# Patient Record
Sex: FEMALE | Race: WHITE
[De-identification: names, ages, dates, MRNs, and addresses within clinical notes are randomized per-mention and may not be internally consistent; named-entity substitution may affect disease eponyms.]

---

## 2019-03-07 ENCOUNTER — HOSPITAL ENCOUNTER (OUTPATIENT)
Dept: HOSPITAL 75 - PREOP | Age: 59
Discharge: HOME | End: 2019-03-07
Attending: PODIATRIST
Payer: COMMERCIAL

## 2019-03-07 VITALS — HEIGHT: 69 IN | WEIGHT: 205 LBS | BODY MASS INDEX: 30.36 KG/M2

## 2019-03-07 DIAGNOSIS — Z01.818: Primary | ICD-10-CM

## 2019-03-11 ENCOUNTER — HOSPITAL ENCOUNTER (OUTPATIENT)
Dept: HOSPITAL 75 - SDC | Age: 59
Discharge: HOME | End: 2019-03-11
Attending: PODIATRIST
Payer: COMMERCIAL

## 2019-03-11 VITALS — SYSTOLIC BLOOD PRESSURE: 135 MMHG | DIASTOLIC BLOOD PRESSURE: 90 MMHG

## 2019-03-11 VITALS — DIASTOLIC BLOOD PRESSURE: 94 MMHG | SYSTOLIC BLOOD PRESSURE: 142 MMHG

## 2019-03-11 VITALS — BODY MASS INDEX: 30.36 KG/M2 | WEIGHT: 205 LBS | HEIGHT: 69 IN

## 2019-03-11 VITALS — SYSTOLIC BLOOD PRESSURE: 135 MMHG | DIASTOLIC BLOOD PRESSURE: 96 MMHG

## 2019-03-11 DIAGNOSIS — Z79.899: ICD-10-CM

## 2019-03-11 DIAGNOSIS — F41.9: ICD-10-CM

## 2019-03-11 DIAGNOSIS — S93.144A: ICD-10-CM

## 2019-03-11 DIAGNOSIS — F17.210: ICD-10-CM

## 2019-03-11 DIAGNOSIS — I10: ICD-10-CM

## 2019-03-11 DIAGNOSIS — M89.371: ICD-10-CM

## 2019-03-11 DIAGNOSIS — E66.9: ICD-10-CM

## 2019-03-11 DIAGNOSIS — M20.41: Primary | ICD-10-CM

## 2019-03-11 PROCEDURE — 73620 X-RAY EXAM OF FOOT: CPT

## 2019-03-11 PROCEDURE — 87081 CULTURE SCREEN ONLY: CPT

## 2019-03-11 RX ADMIN — SODIUM CHLORIDE, SODIUM LACTATE, POTASSIUM CHLORIDE, AND CALCIUM CHLORIDE PRN MLS/HR: 600; 310; 30; 20 INJECTION, SOLUTION INTRAVENOUS at 08:31

## 2019-03-11 RX ADMIN — SODIUM CHLORIDE, SODIUM LACTATE, POTASSIUM CHLORIDE, AND CALCIUM CHLORIDE PRN MLS/HR: 600; 310; 30; 20 INJECTION, SOLUTION INTRAVENOUS at 09:17

## 2019-03-11 NOTE — OPERATIVE REPORT
DATE OF SERVICE:  03/11/2019



SURGEON:

Sierra Pichardo DPM.



PREOPERATIVE DIAGNOSES:

1.  Hammer digit syndrome, right second toe.

2.  Hypertrophic second metatarsal head, right.

3.  Ruptured plantar plate, right second metatarsophalangeal joint.



POSTOPERATIVE DIAGNOSES:

1.  Hammer digit syndrome, right second toe.

2.  Hypertrophic second metatarsal head, right.

3.  Ruptured plantar plate, right second metatarsophalangeal joint.



PROCEDURES:

1.  Reduction of hammertoe, right second digit.

2.  Second metatarsal osteotomy.

3.  Repair of plantar plate, right second metatarsophalangeal joint.



WOUND CLASS:

Clean.



ANESTHESIA:

General.



HEMOSTASIS:

Pneumatic thigh tourniquet at 250 mmHg.



INDICATIONS:

This 58-year-old female presents complaining of painful right forefoot. 

Conservative therapy is met with unsatisfactory results and the patient is

agreeable to surgical intervention after risk and complications were discussed

at length.  No guarantees were extended to the patient and she is willing to

proceed.



DESCRIPTION OF PROCEDURE:

The patient was brought back to the operating table, placed in secure supine

position.  General anesthetic was then induced.  A pneumatic thigh tourniquet

was placed on the right lower extremity over several layers of padding. 

Appropriate timeout was performed.  The right foot was then anesthetized

utilizing aseptic technique utilizing 10 mL of 0.5% Marcaine injected in a local

infusion to the second ray of the right foot.  The right foot was then prepped

and draped in normal sterile manner.  The right foot was then elevated and

allowed to exsanguinate after which the tourniquet was inflated to 250 mmHg.



Attention was then directed to the dorsal aspect of the right second ray where a

5 cm longitudinal linear incision was created overlying the surgical neck of the

second metatarsal extending to the distal interphalangeal joint of the right

second toe.  The incision was deepened in the same plane with great care to

identify and retract all vital neurovascular structures.  Only necessary blood

vessels were cauterized as encountered.  The incision was deepened down to the

extensor tendon overlying the proximal phalanx where a Z-slide lengthening was

performed.  The extensor tendon was reflected proximally and distally exposing

the second metatarsophalangeal joint.  A dorsal capsulorrhaphy was released in

the medial collateral ligaments with emphasis to the medial collateral ligament.

 This allowed the proximal phalanx to come back down into a more rectus

alignment.  Next, dorsal and medial and lateral collateral ligaments of the

proximal interphalangeal joint of the right second toe was released exposing the

head of the proximal phalanx, which was then fashioned into a peg utilizing a

power sagittal saw and a power pepe.  A hole was created to the base of the

middle phalanx with a power pepe for a peg-in-hole type arthrodesis.  The wound

was flushed with copious amounts of normal saline.



Attention was then directed to the head of the right second metatarsal where a

Weil type osteotomy was performed.  The capital fragment was translocated

proximally and was held in this position utilizing a K-wire driven from dorsal

to plantar.  This allowed for visualization of the inferior aspect of the right

second metatarsophalangeal joint and for direct visualization of the plantar

plate and capsule.  There is tenosynovitis noted as well as synovitis within the

capsule itself.  There is longitudinal rupture of the plantar plate with exposed

flexor tendon.  Utilizing the [employer] equipment, the second K-wire was driven to

the proximal phalanx and the proximal attachment at the base of the proximal

phalanx was then released sharply.  A primary repair utilizing 3-0 Vicryl of the

plantar plate was performed.  Next, utilizing the [employer] equipment, the

FiberWire was then attached to the most proximal portion of the plantar plate to

the medial and lateral portions of the joint.  Next, two guidewires were driven

from dorsal medial to plantar lateral and from dorsal lateral to plantar medial.

 The FiberWire was then passed through the holes created at the proximal phalanx

and the plantar plate extended distally and secured underneath the proximal

phalanx utilizing the above-mentioned FiberWire.  Prior to the FiberWire being

secured completely; however, the Weil osteotomy was fixated.  The K-wire was

withdrawn from the metatarsal head and the metatarsal head was allowed to recede

proximally and medially from its original anatomical position prior to

procedure.  Two Snap-off screws were applied of 2.0 diameter, 11 and 9 mm in

length.  The 9 mm was proximal and the 11 mm distal to the metatarsal head area.

 The excess metatarsal head to the dorsal aspect of the joint was reduced with a

rongeur followed by a hand rasp.  The wound was flushed with copious amounts of

normal saline.



Next, a 0.062 smooth K-wire was driven down the digit allowing the right second

toe to come into rectus alignment and securing the peg-in-hole arthrodesis in

place.  The excess K-wire out the end of the toe was cut and a protective ball

placed over the end of the wire.  The wound was flushed once again and closure

was then performed in layers.  The FiberWire was secured allowing the proximal

phalanx to get into appropriate anatomical position with the plantar plate now

extended below the base of the proximal phalanx.  The extensor tendon was

repaired utilizing 3-0 Vicryl.  The superficial closure was performed with 4-0

Vicryl, skin was closed with 4-0 Prolene in a horizontal mattress type stitch.



Postoperative injection consisted of 10 mL of 0.5% Marcaine injected in a local

infusion to the surgical site.  Postoperative dressing consisted of Betadine

soaked Adaptic, sterile 4 x 4, sterile Kerlix, all secured with a Coban wrap.



The patient tolerated the anesthesia and procedure well and was transported from

the operating room to the recovery area with vital signs stable and vascular

status intact to all digits of the right foot.  The patient was given

postoperative instructions to be nonweightbearing and to keep the dressing dry,

clean and intact.  She was given a prescription for Vicodin and Keflex.  She is

to follow up in my office in 10 days' period of time or sooner if necessary.





Job ID: [id number]

DocumentID: [id number]

Dictated Date:  03/11/2019 10:53:44

Transcription Date: 03/11/2019 19:24:34

Dictated By: SIERRA PICHARDO DPM

## 2019-03-11 NOTE — DIAGNOSTIC IMAGING REPORT
INDICATION: 

Hammertoe surgery. 



TIME OF EXAMINATION:    

10:55 AM.



FINDINGS:

Two views of the right foot demonstrate a K wire extending

through the second toe. There are postop changes involving the

distal second metatarsal. The alignment is normal.



IMPRESSION: 

Satisfactory postop appearance to the right foot.



Dictated by: 



  Dictated on workstation # [id number]

## 2019-03-11 NOTE — PROGRESS NOTE-POST OPERATIVE
Post-Operative Progess Note


Surgeon (s)/Assistant (s)


Surgeon


NARAYAN PICHARDO DPM


Assistant:  none





Pre-Operative Diagnosis


Hammertoe R-4, Metatarsalgia R-4





Post-Operative Diagnosis





same, plus ruptured plantar plate





Procedure & Operative Findings


Date of Procedure


3/11/19


Procedure Performed/Findings


Reduction of 2nd hammertoe, 2nd metatarsal osteotomy, repair of plant plate 2nd 

MTPJ, all right foot


Anesthesia Type


General





Estimated Blood Loss


Estimated blood loss (mL):  minimal





Specimens/Packing


Specimens Removed


none











NARAYAN PICHARDO DPM Mar 11, 2019 10:40

## 2019-03-11 NOTE — PROGRESS NOTE-PRE OPERATIVE
Pre-Operative Progress Note


H&P Reviewed


The H&P was reviewed, patient examined and no changes noted.


Date Seen by Provider:  Mar 11, 2019


Time Seen by Provider:  08:49


Date H&P Reviewed:  Mar 11, 2019


Time H&P Reviewed:  08:49


Pre-Operative Diagnosis:  Hammertoe R-4, Metatarsalgia R-4











NARAYAN PICHARDO DPM Mar 11, 2019 08:49

## 2019-03-11 NOTE — ANESTHESIA-GENERAL POST-OP
General


Patient Condition


Mental Status/LOC:  Same as Preop


Cardiovascular:  Satisfactory


Nausea/Vomiting:  Absent


Respiratory:  Satisfactory


Pain:  Controlled


Complications:  Absent





Post Op Complications


Complications


None





Follow Up Care/Instructions


Patient Instructions


None needed.





Anesthesia/Patient Condition


Patient Condition


Patient is doing well, no complaints, stable vital signs, no apparent adverse 

anesthesia problems.   


No complications reported per nursing.


D/C home per [facility] Criteria:  Yes











HUBER GIMENEZ CRNA Mar 11, 2019 12:05

## 2019-03-11 NOTE — PHYSICAL THERAPY ORTHO EVAL
PT Orthopedic Evaluation


Type of Surgery





right nileshertolatha





Prior Level of Function


Locomotion     (Upon Admit):  Independent


Established Durable Medical Eq:  Crutches


knee scooter





Subjective


Subjective


Patient in bed pre tx, agrees to PT, has no pain at rest.


Entry Into Home:  Stairs With Railing


Steps Into Home:  3





Motor Control


Motor Control:  Motor Control WNL





ROM


ROM:  WFL





Strength


Strength:  WFL





Transfer


Transfers (B, C, W/C) (FIM):  5





Gait


Gait Assistive Device:  Crutches


Right Lower Extremity:  Right


Weight Bearing Status RLE:  Non Weight Bearing


Left Lower Extremity:  Left


Weight Bearing Status LLE:  Full Weight Bearing


Gait (FIM):  2


Distance:  100'


Gait Level of Assist:  5


Summary/Comments


Patient ambulated 100' with axillary crutches with SBA, she had some 

unsteadiness but no LOB and just needed cues to slow down.  Patient went up and 

down 1 step using crutches with SBA and cues for foot placement.  Patient was 

compliant with her weight bearing status after cues but was bearing too much 

weight initially.





Treatment Rendered


Treatment:  Therapeutic Exercises, Gait Train, Step Train


Exercise Instruction:  Heel Slides, Ankle Pumps





Assessment/Goals


Goal Time Frame:  1 Visit





Plan


Treatment Plan:  Discharge


PT/Family Agrees to Plan:  Yes





Time


Time In:  1145


Time Out:  1205


Total Billed Treatment Time:  20


Billed Treatment Time


1 visit


KANDI 20'











COLIN WOODARD PT Mar 11, 2019 13:06